# Patient Record
Sex: FEMALE | Race: WHITE | NOT HISPANIC OR LATINO | Employment: FULL TIME | URBAN - METROPOLITAN AREA
[De-identification: names, ages, dates, MRNs, and addresses within clinical notes are randomized per-mention and may not be internally consistent; named-entity substitution may affect disease eponyms.]

---

## 2023-07-21 ENCOUNTER — OFFICE VISIT (OUTPATIENT)
Dept: PRIMARY CARE | Facility: CLINIC | Age: 53
End: 2023-07-21
Payer: COMMERCIAL

## 2023-07-21 VITALS
WEIGHT: 152 LBS | BODY MASS INDEX: 28.7 KG/M2 | DIASTOLIC BLOOD PRESSURE: 89 MMHG | HEART RATE: 72 BPM | OXYGEN SATURATION: 98 % | HEIGHT: 61 IN | SYSTOLIC BLOOD PRESSURE: 149 MMHG

## 2023-07-21 DIAGNOSIS — Z79.899 DRUG THERAPY: ICD-10-CM

## 2023-07-21 DIAGNOSIS — Z00.00 PREVENTATIVE HEALTH CARE: Primary | ICD-10-CM

## 2023-07-21 DIAGNOSIS — Z11.59 NEED FOR HEPATITIS C SCREENING TEST: ICD-10-CM

## 2023-07-21 DIAGNOSIS — J30.2 SEASONAL ALLERGIES: ICD-10-CM

## 2023-07-21 DIAGNOSIS — R20.2 PARESTHESIA: ICD-10-CM

## 2023-07-21 DIAGNOSIS — Z13.9 SCREENING FOR CONDITION: ICD-10-CM

## 2023-07-21 DIAGNOSIS — E55.9 VITAMIN D DEFICIENCY: ICD-10-CM

## 2023-07-21 PROCEDURE — 99215 OFFICE O/P EST HI 40 MIN: CPT | Performed by: FAMILY MEDICINE

## 2023-07-21 PROCEDURE — 99396 PREV VISIT EST AGE 40-64: CPT | Performed by: FAMILY MEDICINE

## 2023-07-21 RX ORDER — MONTELUKAST SODIUM 10 MG/1
10 TABLET ORAL NIGHTLY
Qty: 30 TABLET | Refills: 3 | Status: SHIPPED | OUTPATIENT
Start: 2023-07-21 | End: 2023-10-24 | Stop reason: SDUPTHER

## 2023-07-21 NOTE — PROGRESS NOTES
Subjective   Patient ID: Anu Arcos is a 52 y.o. female who presents for Establish Care (Pt in today to establish care).    Review of Systems  Denies N/V/D/C, no HA/S/V, denies rashes/lesions, no CP/SOB. Denies fevers/chills.  Positive for bilateral upper and lower extremity paresthesias at times.  All other systems were negative.    Objective   Physical Exam  Gen: NAD  eyes: EOMI, PERRLA  ENT: hearing grossly intact, no nasal discharge  resp: CTABL, without R/R  heart: RRR without MRG  GI: abd: S/ND/NT, BS+  lymph: no axillary, cervical, supraclavicular lymphadenopathy noted   MS: gait grossly WNL,  derm: no rashes or lesions noted  neuro: CN II-XII grossly intact  psych: A&Ox3    Assessment/Plan   Diagnoses and all orders for this visit:  Preventative health care  -     CBC and Auto Differential; Future  -     Comprehensive Metabolic Panel; Future  -     TSH with reflex to Free T4 if abnormal; Future  -     Magnesium; Future  -     Lipid Panel; Future  -     Hemoglobin A1C; Future  -     Urinalysis with Reflex Microscopic; Future  -     Vitamin D 25-Hydroxy,Total; Future  -     Hepatitis C antibody; Future  Seasonal allergies  -     montelukast (Singulair) 10 mg tablet; Take 1 tablet (10 mg) by mouth once daily at bedtime.  Drug therapy  -     CBC and Auto Differential; Future  -     Comprehensive Metabolic Panel; Future  -     Magnesium; Future  -     Urinalysis with Reflex Microscopic; Future  Screening for condition  -     TSH with reflex to Free T4 if abnormal; Future  -     Lipid Panel; Future  -     Hemoglobin A1C; Future  Vitamin D deficiency  -     Vitamin D 25-Hydroxy,Total; Future  Need for hepatitis C screening test  -     Hepatitis C antibody; Future  Paresthesia           Annual preventative visit and establish.    -----  Screening for colon cancer.  Patient due for colonoscopy repeat around 2028 through 2031.  Screening for breast cancer. -->>  Getting mammograms about every 2 years.  Will repeat  around December 2023.  Due for screening for cervical cancer.  Can do at next appointment.  Screening for hep C will be done with next labs.    Due for tetanus shot, coronavirus series, shingles series.  Will be due for flu shot end of summer. -->>  Check with your pharmacy to keep up-to-date on immunizations.  -----    BMI 28.  Down about 2 pounds from last weight on the chart 2 years ago. -->> Try to cut back a little on simple carbohydrates, things like bread, pasta, potatoes, rice, sugars etc.    Regarding previous labs:  -Drug therapy, screening for condition.  -Vitamin D deficiency.  Patient is not on any vitamin D at this time.  -->>  Recommend starting vitamin D 2000 units daily.  Get it over-the-counter.  -History of anemia secondary to GI bleed/ulcer.  We will just be checking CBC with these labs.    Seasonal allergies, bad except in the winter.  In general does well with Zyrtec plus Singulair.  Has been out of Singulair and definitely noticing at this time. -->> We will refill Singulair.    GERD, occasionally wakes up with acid in her throat.  Having symptoms maybe 3-4 times a month. -->>  Recommend every time you have symptoms take an antacid like Tums or Maalox along with famotidine 20 mg.    Bilateral hand paresthesias, worse using hands. Getting some permanent change in hands. Also bilateral foot plantar paresthesiae, occasional, especially at night. Might benefit by EMG. -->>  Referring to neurology to evaluate and treat.       - Patient will return in about 3 months for annual lab review and Pap.  - I am ordering fasting annual labs including hepatitis C screening to do at a convenient location about a week before your next appointment.

## 2023-07-21 NOTE — PATIENT INSTRUCTIONS
Annual preventative visit and establish.    -----  Screening for colon cancer.  Patient due for colonoscopy repeat around 2028 through 2031.  Screening for breast cancer. -->>  Getting mammograms about every 2 years.  Will repeat around December 2023.  Due for screening for cervical cancer.  Can do at next appointment.  Screening for hep C will be done with next labs.    Due for tetanus shot, coronavirus series, shingles series.  Will be due for flu shot end of summer. -->>  Check with your pharmacy to keep up-to-date on immunizations.  -----    BMI 28.  Down about 2 pounds from last weight on the chart 2 years ago. -->> Try to cut back a little on simple carbohydrates, things like bread, pasta, potatoes, rice, sugars etc.    Regarding previous labs:  -Drug therapy, screening for condition.  -Vitamin D deficiency.  Patient is not on any vitamin D at this time.  -->>  Recommend starting vitamin D 2000 units daily.  Get it over-the-counter.  -History of anemia secondary to GI bleed/ulcer.  We will just be checking CBC with these labs.    Seasonal allergies, bad except in the winter.  In general does well with Zyrtec plus Singulair.  Has been out of Singulair and definitely noticing at this time. -->> We will refill Singulair.    GERD, occasionally wakes up with acid in her throat.  Having symptoms maybe 3-4 times a month. -->>  Recommend every time you have symptoms take an antacid like Tums or Maalox along with famotidine 20 mg.    Bilateral hand paresthesias, worse using hands. Getting some permanent change in hands. Also bilateral foot plantar paresthesiae, occasional, especially at night. Might benefit by EMG. -->>  Referring to neurology to evaluate and treat.       - Patient will return in about 3 months for annual lab review and Pap.  - I am ordering fasting annual labs including hepatitis C screening to do at a convenient location about a week before your next appointment.

## 2023-09-19 PROBLEM — J45.909 ASTHMA (HHS-HCC): Status: ACTIVE | Noted: 2023-09-19

## 2023-09-19 PROBLEM — J01.90 ACUTE SINUSITIS: Status: ACTIVE | Noted: 2023-09-19

## 2023-09-19 PROBLEM — K92.1 MELENA: Status: ACTIVE | Noted: 2023-09-19

## 2023-09-19 PROBLEM — F41.9 ANXIETY DISORDER: Status: ACTIVE | Noted: 2023-09-19

## 2023-09-19 PROBLEM — R42 DIZZINESS: Status: ACTIVE | Noted: 2023-09-19

## 2023-09-19 PROBLEM — R53.83 FATIGUE: Status: ACTIVE | Noted: 2023-09-19

## 2023-09-19 PROBLEM — D50.9 IRON DEFICIENCY ANEMIA: Status: ACTIVE | Noted: 2023-09-19

## 2023-09-19 PROBLEM — D64.9 NORMOCYTIC ANEMIA: Status: ACTIVE | Noted: 2023-09-19

## 2023-09-19 RX ORDER — METHYLPREDNISOLONE 4 MG/1
TABLET ORAL
COMMUNITY
Start: 2022-10-14 | End: 2023-10-24 | Stop reason: ALTCHOICE

## 2023-09-19 RX ORDER — SUMATRIPTAN 50 MG/1
50 TABLET, FILM COATED ORAL
COMMUNITY
Start: 2019-03-07 | End: 2023-10-24 | Stop reason: ALTCHOICE

## 2023-09-19 RX ORDER — FLUTICASONE PROPIONATE 50 MCG
SPRAY, SUSPENSION (ML) NASAL
COMMUNITY

## 2023-09-19 RX ORDER — ASCORBIC ACID 500 MG
TABLET ORAL
COMMUNITY
Start: 2020-08-10

## 2023-09-19 RX ORDER — AZITHROMYCIN 250 MG/1
TABLET, FILM COATED ORAL
COMMUNITY
End: 2023-10-24 | Stop reason: ALTCHOICE

## 2023-09-19 RX ORDER — AZELASTINE 1 MG/ML
1 SPRAY, METERED NASAL 2 TIMES DAILY
COMMUNITY
Start: 2019-03-07

## 2023-09-19 RX ORDER — ALBUTEROL SULFATE 90 UG/1
1 AEROSOL, METERED RESPIRATORY (INHALATION) EVERY 6 HOURS PRN
COMMUNITY
Start: 2019-05-01

## 2023-09-19 RX ORDER — BENZONATATE 100 MG/1
100 CAPSULE ORAL EVERY 8 HOURS PRN
COMMUNITY
Start: 2019-06-30 | End: 2023-10-24 | Stop reason: ALTCHOICE

## 2023-09-19 RX ORDER — FERROUS GLUCONATE 324(38)MG
1 TABLET ORAL DAILY
COMMUNITY
Start: 2021-03-01 | End: 2023-10-24 | Stop reason: ALTCHOICE

## 2023-09-19 RX ORDER — FLUTICASONE FUROATE AND VILANTEROL 100; 25 UG/1; UG/1
POWDER RESPIRATORY (INHALATION)
COMMUNITY
Start: 2020-08-10 | End: 2023-10-24 | Stop reason: ALTCHOICE

## 2023-09-19 RX ORDER — CALCIUM CARBONATE/VITAMIN D3 600MG-5MCG
TABLET ORAL
COMMUNITY
Start: 2020-08-10

## 2023-10-19 ENCOUNTER — OFFICE VISIT (OUTPATIENT)
Dept: NEUROLOGY | Facility: CLINIC | Age: 53
End: 2023-10-19
Payer: COMMERCIAL

## 2023-10-19 ENCOUNTER — LAB (OUTPATIENT)
Dept: LAB | Facility: LAB | Age: 53
End: 2023-10-19
Payer: COMMERCIAL

## 2023-10-19 VITALS
SYSTOLIC BLOOD PRESSURE: 136 MMHG | DIASTOLIC BLOOD PRESSURE: 90 MMHG | HEIGHT: 61 IN | HEART RATE: 68 BPM | WEIGHT: 148 LBS | BODY MASS INDEX: 27.94 KG/M2

## 2023-10-19 DIAGNOSIS — Z13.9 SCREENING FOR CONDITION: ICD-10-CM

## 2023-10-19 DIAGNOSIS — R20.9 ALTERATIONS OF SENSATIONS: ICD-10-CM

## 2023-10-19 DIAGNOSIS — Z11.59 NEED FOR HEPATITIS C SCREENING TEST: ICD-10-CM

## 2023-10-19 DIAGNOSIS — E55.9 VITAMIN D DEFICIENCY: ICD-10-CM

## 2023-10-19 DIAGNOSIS — R20.0 NUMBNESS AND TINGLING: Primary | ICD-10-CM

## 2023-10-19 DIAGNOSIS — R20.2 NUMBNESS AND TINGLING: Primary | ICD-10-CM

## 2023-10-19 DIAGNOSIS — Z79.899 DRUG THERAPY: ICD-10-CM

## 2023-10-19 DIAGNOSIS — Z00.00 PREVENTATIVE HEALTH CARE: ICD-10-CM

## 2023-10-19 LAB
25(OH)D3 SERPL-MCNC: 32 NG/ML (ref 30–100)
ALBUMIN SERPL BCP-MCNC: 4.6 G/DL (ref 3.4–5)
ALP SERPL-CCNC: 88 U/L (ref 33–110)
ALT SERPL W P-5'-P-CCNC: 20 U/L (ref 7–45)
ANION GAP SERPL CALC-SCNC: 11 MMOL/L (ref 10–20)
AST SERPL W P-5'-P-CCNC: 17 U/L (ref 9–39)
BASOPHILS # BLD AUTO: 0.06 X10*3/UL (ref 0–0.1)
BASOPHILS NFR BLD AUTO: 0.9 %
BILIRUB SERPL-MCNC: 0.5 MG/DL (ref 0–1.2)
BUN SERPL-MCNC: 10 MG/DL (ref 6–23)
CALCIUM SERPL-MCNC: 9.3 MG/DL (ref 8.6–10.3)
CHLORIDE SERPL-SCNC: 105 MMOL/L (ref 98–107)
CHOLEST SERPL-MCNC: 194 MG/DL (ref 0–199)
CHOLESTEROL/HDL RATIO: 3.2
CO2 SERPL-SCNC: 30 MMOL/L (ref 21–32)
CREAT SERPL-MCNC: 0.67 MG/DL (ref 0.5–1.05)
EOSINOPHIL # BLD AUTO: 0.18 X10*3/UL (ref 0–0.7)
EOSINOPHIL NFR BLD AUTO: 2.7 %
ERYTHROCYTE [DISTWIDTH] IN BLOOD BY AUTOMATED COUNT: 13 % (ref 11.5–14.5)
EST. AVERAGE GLUCOSE BLD GHB EST-MCNC: 100 MG/DL
GFR SERPL CREATININE-BSD FRML MDRD: >90 ML/MIN/1.73M*2
GLUCOSE SERPL-MCNC: 82 MG/DL (ref 74–99)
HBA1C MFR BLD: 5.1 %
HCT VFR BLD AUTO: 43.8 % (ref 36–46)
HCV AB SER QL: NONREACTIVE
HDLC SERPL-MCNC: 61.2 MG/DL
HGB BLD-MCNC: 14.2 G/DL (ref 12–16)
IMM GRANULOCYTES # BLD AUTO: 0.03 X10*3/UL (ref 0–0.7)
IMM GRANULOCYTES NFR BLD AUTO: 0.4 % (ref 0–0.9)
LDLC SERPL CALC-MCNC: 107 MG/DL
LYMPHOCYTES # BLD AUTO: 1.86 X10*3/UL (ref 1.2–4.8)
LYMPHOCYTES NFR BLD AUTO: 27.7 %
MAGNESIUM SERPL-MCNC: 2.19 MG/DL (ref 1.6–2.4)
MCH RBC QN AUTO: 29.4 PG (ref 26–34)
MCHC RBC AUTO-ENTMCNC: 32.4 G/DL (ref 32–36)
MCV RBC AUTO: 91 FL (ref 80–100)
MONOCYTES # BLD AUTO: 0.59 X10*3/UL (ref 0.1–1)
MONOCYTES NFR BLD AUTO: 8.8 %
NEUTROPHILS # BLD AUTO: 4 X10*3/UL (ref 1.2–7.7)
NEUTROPHILS NFR BLD AUTO: 59.5 %
NON HDL CHOLESTEROL: 133 MG/DL (ref 0–149)
NRBC BLD-RTO: 0 /100 WBCS (ref 0–0)
PLATELET # BLD AUTO: 253 X10*3/UL (ref 150–450)
PMV BLD AUTO: 10.6 FL (ref 7.5–11.5)
POTASSIUM SERPL-SCNC: 4.3 MMOL/L (ref 3.5–5.3)
PROT SERPL-MCNC: 7.1 G/DL (ref 6.4–8.2)
RBC # BLD AUTO: 4.83 X10*6/UL (ref 4–5.2)
SODIUM SERPL-SCNC: 142 MMOL/L (ref 136–145)
TRIGL SERPL-MCNC: 131 MG/DL (ref 0–149)
TSH SERPL-ACNC: 1.57 MIU/L (ref 0.44–3.98)
VLDL: 26 MG/DL (ref 0–40)
WBC # BLD AUTO: 6.7 X10*3/UL (ref 4.4–11.3)

## 2023-10-19 PROCEDURE — 85025 COMPLETE CBC W/AUTO DIFF WBC: CPT

## 2023-10-19 PROCEDURE — 36415 COLL VENOUS BLD VENIPUNCTURE: CPT

## 2023-10-19 PROCEDURE — 80061 LIPID PANEL: CPT

## 2023-10-19 PROCEDURE — 86803 HEPATITIS C AB TEST: CPT

## 2023-10-19 PROCEDURE — 83735 ASSAY OF MAGNESIUM: CPT

## 2023-10-19 PROCEDURE — 99214 OFFICE O/P EST MOD 30 MIN: CPT | Performed by: NURSE PRACTITIONER

## 2023-10-19 PROCEDURE — 84443 ASSAY THYROID STIM HORMONE: CPT

## 2023-10-19 PROCEDURE — 1036F TOBACCO NON-USER: CPT | Performed by: NURSE PRACTITIONER

## 2023-10-19 PROCEDURE — 80053 COMPREHEN METABOLIC PANEL: CPT

## 2023-10-19 PROCEDURE — 82306 VITAMIN D 25 HYDROXY: CPT

## 2023-10-19 PROCEDURE — 83036 HEMOGLOBIN GLYCOSYLATED A1C: CPT

## 2023-10-19 ASSESSMENT — ENCOUNTER SYMPTOMS
OCCASIONAL FEELINGS OF UNSTEADINESS: 0
DEPRESSION: 0
LOSS OF SENSATION IN FEET: 0

## 2023-10-19 ASSESSMENT — PATIENT HEALTH QUESTIONNAIRE - PHQ9
2. FEELING DOWN, DEPRESSED OR HOPELESS: NOT AT ALL
SUM OF ALL RESPONSES TO PHQ9 QUESTIONS 1 AND 2: 0
1. LITTLE INTEREST OR PLEASURE IN DOING THINGS: NOT AT ALL

## 2023-10-19 NOTE — PROGRESS NOTES
Patient being assessed today for initial evaluation of numbness and tingling.  She states that for the past 2 years she has been experiencing numbness and tingling in all of her fingertips.  The intensity changes day-to-day.  She also experiences numbness and tingling in all of her toes and over the summer was at the bottom of her feet.  There is no radiation up the extremities.  She does have some dexterity issues as well as sensation changed to hot and cold.  Some alteration also to cotton and pinprick.  Strength and muscle tone equal bilaterally.  We will get an EMG to rule out possible small fiber neuropathy.  She has labs that are pending through her PCP.  Also discussed patient could start taking a B complex vitamin supplement that may help reduce some of her symptoms.  Follow-up after testing completed.    This note was created with voice recognition software and was not corrected for typographical or grammatical errors

## 2023-10-20 ENCOUNTER — APPOINTMENT (OUTPATIENT)
Dept: PRIMARY CARE | Facility: CLINIC | Age: 53
End: 2023-10-20
Payer: COMMERCIAL

## 2023-10-24 ENCOUNTER — OFFICE VISIT (OUTPATIENT)
Dept: PRIMARY CARE | Facility: CLINIC | Age: 53
End: 2023-10-24
Payer: COMMERCIAL

## 2023-10-24 VITALS
DIASTOLIC BLOOD PRESSURE: 83 MMHG | SYSTOLIC BLOOD PRESSURE: 134 MMHG | WEIGHT: 150 LBS | OXYGEN SATURATION: 98 % | HEART RATE: 77 BPM | HEIGHT: 61 IN | BODY MASS INDEX: 28.32 KG/M2

## 2023-10-24 DIAGNOSIS — Z12.4 CERVICAL CANCER SCREENING: ICD-10-CM

## 2023-10-24 DIAGNOSIS — R20.0 NUMBNESS AND TINGLING: ICD-10-CM

## 2023-10-24 DIAGNOSIS — J30.2 SEASONAL ALLERGIES: ICD-10-CM

## 2023-10-24 DIAGNOSIS — Z13.9 SCREENING FOR CONDITION: ICD-10-CM

## 2023-10-24 DIAGNOSIS — E55.9 VITAMIN D DEFICIENCY: ICD-10-CM

## 2023-10-24 DIAGNOSIS — Z79.899 DRUG THERAPY: ICD-10-CM

## 2023-10-24 DIAGNOSIS — E78.5 HYPERLIPIDEMIA, UNSPECIFIED HYPERLIPIDEMIA TYPE: ICD-10-CM

## 2023-10-24 DIAGNOSIS — Z00.00 PREVENTATIVE HEALTH CARE: ICD-10-CM

## 2023-10-24 DIAGNOSIS — R20.2 PARESTHESIA: ICD-10-CM

## 2023-10-24 DIAGNOSIS — Z12.31 ENCOUNTER FOR SCREENING MAMMOGRAM FOR MALIGNANT NEOPLASM OF BREAST: Primary | ICD-10-CM

## 2023-10-24 DIAGNOSIS — R20.2 NUMBNESS AND TINGLING: ICD-10-CM

## 2023-10-24 PROCEDURE — 1036F TOBACCO NON-USER: CPT | Performed by: FAMILY MEDICINE

## 2023-10-24 PROCEDURE — 99417 PROLNG OP E/M EACH 15 MIN: CPT | Performed by: FAMILY MEDICINE

## 2023-10-24 PROCEDURE — 88175 CYTOPATH C/V AUTO FLUID REDO: CPT

## 2023-10-24 PROCEDURE — 87624 HPV HI-RISK TYP POOLED RSLT: CPT

## 2023-10-24 PROCEDURE — 99215 OFFICE O/P EST HI 40 MIN: CPT | Performed by: FAMILY MEDICINE

## 2023-10-24 RX ORDER — MONTELUKAST SODIUM 10 MG/1
10 TABLET ORAL NIGHTLY
Qty: 90 TABLET | Refills: 3 | Status: SHIPPED | OUTPATIENT
Start: 2023-10-24 | End: 2024-10-18

## 2023-10-24 NOTE — PATIENT INSTRUCTIONS
2024 will be next annual preventative visit.     -----  Screening for colon cancer.  Patient due for colonoscopy repeat around 2028 through 2031.  Screening for breast cancer. -->>  Getting mammograms about every 2 years.  Will repeat around December 2023.  Due for screening for cervical cancer.  Can do at next appointment.  Screening for hep C was nonreactive.     Due for tetanus shot, coronavirus series, shingles series.  Will be due for flu shot end of summer. -->>  Check with your pharmacy to keep up-to-date on immunizations.  -----     BMI 28.  about same as last appt. -->> Try to cut back a little on simple carbohydrates, things like bread, pasta, potatoes, rice, sugars etc.     Regarding previous labs:  - Drug therapy, screening for condition.  A1c is 5.1 so no sign of diabetes.  - Vitamin D deficiency.  32 on this lab.  Is on 2000 units daily.  Has been for a couple of months. -->>  Recommend increasing to 5000 units daily.  We will recheck in 12 months.    - Hyperlipidemia: HDL is 61, goal is over 45.  LDL is 107, your goal is less than 100.  Total divided by good ratio is 3.2, your goal is less than 3.4.  So overall cholesterol numbers are good without any meds.     Seasonal allergies, with asthma.  Bad except in the winter. Doing very well with Zyrtec plus Singulair plus as Astelin nasal spray as needed..  Needing albuterol inhaler less than once a week during the summer, not at all during the winter.-->> We will refill Singulair as needed.     GERD. Having symptoms maybe 2 times a month, improved by paying better attention to what she is eating and when she is eating.  Generally does well enough taking omeprazole just a couple times a month as needed. -->> Recommend every time you have symptoms take an antacid like Tums or Maalox along with famotidine 20 mg.     Bilateral hand paresthesias, worse using hands. Carpal tunnel is in the differential.  In getting some permanent change in hands. Also bilateral  foot plantar paresthesiae, occasional, especially at night. Might benefit by EMG.  Seeing neurology.  They discussed an EMG.  Patient has orders for that.     Headaches, history of migraine.  Imitrex was not helpful and actually caused more dizziness and side effects than it was worth.  Does okay taking and using Tylenol as needed.     Subscapular spasms the other day.  First time she is ever had that.  Went to urgent care, was given Toradol and steroid injections, also muscle relaxers.  Resolved at this time. -->>  For future spasms, could try using heat because that can soften muscles.  Also try to wrap around the muscle to allow it to be shorter, that can help her relax.  Could consider starting a magnesium supplement like magnesium oxide 250, 400, or 500 mg once daily.  Note magnesium can also lessen frequency of headaches.     Postmenopausal, gets occasional hot flashes, nothing too serious.  In general doing fairly well.  Female exam and Pap done today.     - Patient will return in about 12 months for annual preventative visit and annual lab review.  -We will order annual labs for patient to get fasting annual labs about a week before next appointment.

## 2023-10-24 NOTE — PROGRESS NOTES
Subjective   Patient ID: Anu Arcos is a 53 y.o. female who presents for 3 Month FU / Pap Test (Pt in today for routine 3 month FU / Pap Test).    Review of Systems  Denies N/V/D/C, no HA/S/V, denies rashes/lesions, no CP/SOB. Denies fevers/chills.  Positive for occasional heartburn.  All other systems were negative.    Objective   Physical Exam  Gen: NAD  eyes: EOMI, PERRLA  ENT: hearing grossly intact, no nasal discharge  resp: CTABL, without R/R  heart: RRR without MRG  GI: abd: S/ND/NT, BS+  Breast: No masses or tenderness or discoloration or lesions are noted. No discharge noted.  : External genitalia without masses or lesion, no exudate or discharge. Vaginal vault is without discharge. Cervix is retroverted to the left, normal color, no lesion, os os is closed. Uterus and cervix nontender. No other masses or tenderness are noted.  lymph: no axillary, cervical, supraclavicular lymphadenopathy noted   MS: gait grossly WNL,  derm: no rashes or lesions noted  neuro: CN II-XII grossly intact  psych: A&Ox3      Assessment/Plan   Diagnoses and all orders for this visit:  Cervical cancer screening  -     THINPREP PAP      2024 will be next annual preventative visit.     -----  Screening for colon cancer.  Patient due for colonoscopy repeat around 2028 through 2031.  Screening for breast cancer. -->>  Getting mammograms about every 2 years.  Will repeat around December 2023.  Due for screening for cervical cancer.  Can do at next appointment.  Screening for hep C was nonreactive.    Due for tetanus shot, coronavirus series, shingles series.  Will be due for flu shot end of summer. -->>  Check with your pharmacy to keep up-to-date on immunizations.  -----     BMI 28.  about same as last appt. -->> Try to cut back a little on simple carbohydrates, things like bread, pasta, potatoes, rice, sugars etc.    Regarding previous labs:  - Drug therapy, screening for condition.  A1c is 5.1 so no sign of diabetes.  - Vitamin D  deficiency.  32 on this lab.  Is on 2000 units daily.  Has been for a couple of months. -->>  Recommend increasing to 5000 units daily.  We will recheck in 12 months.    - Hyperlipidemia: HDL is 61, goal is over 45.  LDL is 107, your goal is less than 100.  Total divided by good ratio is 3.2, your goal is less than 3.4.  So overall cholesterol numbers are good without any meds.    Seasonal allergies, with asthma.  Bad except in the winter. Doing very well with Zyrtec plus Singulair plus as Astelin nasal spray as needed..  Needing albuterol inhaler less than once a week during the summer, not at all during the winter.-->> We will refill Singulair as needed.     GERD. Having symptoms maybe 2 times a month, improved by paying better attention to what she is eating and when she is eating.  Generally does well enough taking omeprazole just a couple times a month as needed. -->> Recommend every time you have symptoms take an antacid like Tums or Maalox along with famotidine 20 mg.     Bilateral hand paresthesias, worse using hands. Carpal tunnel is in the differential.  In getting some permanent change in hands. Also bilateral foot plantar paresthesiae, occasional, especially at night. Might benefit by EMG.  Seeing neurology.  They discussed an EMG.  Patient has orders for that.    Headaches, history of migraine.  Imitrex was not helpful and actually caused more dizziness and side effects than it was worth.  Does okay taking and using Tylenol as needed.    Subscapular spasms the other day.  First time she is ever had that.  Went to urgent care, was given Toradol and steroid injections, also muscle relaxers.  Resolved at this time. -->>  For future spasms, could try using heat because that can soften muscles.  Also try to wrap around the muscle to allow it to be shorter, that can help her relax.  Could consider starting a magnesium supplement like magnesium oxide 250, 400, or 500 mg once daily.  Note magnesium can also  lessen frequency of headaches.    Postmenopausal, gets occasional hot flashes, nothing too serious.  In general doing fairly well.  Female exam and Pap done today.    - Patient will return in about 12 months for annual preventative visit and annual lab review.  -We will order annual labs for patient to get fasting annual labs about a week before next appointment.

## 2023-11-08 LAB
CYTOLOGY CMNT CVX/VAG CYTO-IMP: NORMAL
HPV HR GENOTYPES PNL CVX NAA+PROBE: NEGATIVE
HPV HR GENOTYPES PNL CVX NAA+PROBE: NEGATIVE
HPV16 DNA SPEC QL NAA+PROBE: NEGATIVE
HPV18 DNA SPEC QL NAA+PROBE: NEGATIVE
LAB AP HPV GENOTYPE QUESTION: YES
LAB AP HPV HR: NORMAL
LABORATORY COMMENT REPORT: NORMAL
LMP START DATE: NORMAL
MENSTRUAL HX REPORTED: NORMAL
PATH REPORT.TOTAL CANCER: NORMAL

## 2024-07-16 ENCOUNTER — HOSPITAL ENCOUNTER (OUTPATIENT)
Dept: RADIOLOGY | Facility: CLINIC | Age: 54
Discharge: HOME | End: 2024-07-16
Payer: COMMERCIAL

## 2024-07-16 VITALS — WEIGHT: 150 LBS | HEIGHT: 61 IN | BODY MASS INDEX: 28.32 KG/M2

## 2024-07-16 DIAGNOSIS — Z12.31 ENCOUNTER FOR SCREENING MAMMOGRAM FOR MALIGNANT NEOPLASM OF BREAST: ICD-10-CM

## 2024-07-16 PROCEDURE — 77067 SCR MAMMO BI INCL CAD: CPT | Performed by: RADIOLOGY

## 2024-07-16 PROCEDURE — 77063 BREAST TOMOSYNTHESIS BI: CPT

## 2024-07-16 PROCEDURE — 77063 BREAST TOMOSYNTHESIS BI: CPT | Performed by: RADIOLOGY

## 2024-10-18 ENCOUNTER — LAB (OUTPATIENT)
Dept: LAB | Facility: LAB | Age: 54
End: 2024-10-18
Payer: COMMERCIAL

## 2024-10-18 DIAGNOSIS — Z00.00 PREVENTATIVE HEALTH CARE: ICD-10-CM

## 2024-10-18 DIAGNOSIS — E55.9 VITAMIN D DEFICIENCY: ICD-10-CM

## 2024-10-18 DIAGNOSIS — E78.5 HYPERLIPIDEMIA, UNSPECIFIED HYPERLIPIDEMIA TYPE: ICD-10-CM

## 2024-10-18 DIAGNOSIS — Z79.899 DRUG THERAPY: ICD-10-CM

## 2024-10-18 DIAGNOSIS — Z13.9 SCREENING FOR CONDITION: ICD-10-CM

## 2024-10-18 LAB
25(OH)D3 SERPL-MCNC: 54 NG/ML (ref 30–100)
ALBUMIN SERPL BCP-MCNC: 4.4 G/DL (ref 3.4–5)
ALP SERPL-CCNC: 93 U/L (ref 33–110)
ALT SERPL W P-5'-P-CCNC: 43 U/L (ref 7–45)
ANION GAP SERPL CALC-SCNC: 11 MMOL/L (ref 10–20)
AST SERPL W P-5'-P-CCNC: 26 U/L (ref 9–39)
BASOPHILS # BLD AUTO: 0.06 X10*3/UL (ref 0–0.1)
BASOPHILS NFR BLD AUTO: 1 %
BILIRUB SERPL-MCNC: 0.5 MG/DL (ref 0–1.2)
BUN SERPL-MCNC: 9 MG/DL (ref 6–23)
CALCIUM SERPL-MCNC: 9.3 MG/DL (ref 8.6–10.3)
CHLORIDE SERPL-SCNC: 105 MMOL/L (ref 98–107)
CHOLEST SERPL-MCNC: 207 MG/DL (ref 0–199)
CHOLESTEROL/HDL RATIO: 3.3
CO2 SERPL-SCNC: 28 MMOL/L (ref 21–32)
CREAT SERPL-MCNC: 0.64 MG/DL (ref 0.5–1.05)
EGFRCR SERPLBLD CKD-EPI 2021: >90 ML/MIN/1.73M*2
EOSINOPHIL # BLD AUTO: 0.29 X10*3/UL (ref 0–0.7)
EOSINOPHIL NFR BLD AUTO: 4.7 %
ERYTHROCYTE [DISTWIDTH] IN BLOOD BY AUTOMATED COUNT: 13 % (ref 11.5–14.5)
EST. AVERAGE GLUCOSE BLD GHB EST-MCNC: 103 MG/DL
GLUCOSE SERPL-MCNC: 78 MG/DL (ref 74–99)
HBA1C MFR BLD: 5.2 %
HCT VFR BLD AUTO: 42.8 % (ref 36–46)
HDLC SERPL-MCNC: 62.3 MG/DL
HGB BLD-MCNC: 14.1 G/DL (ref 12–16)
IMM GRANULOCYTES # BLD AUTO: 0.02 X10*3/UL (ref 0–0.7)
IMM GRANULOCYTES NFR BLD AUTO: 0.3 % (ref 0–0.9)
LDLC SERPL CALC-MCNC: 121 MG/DL
LYMPHOCYTES # BLD AUTO: 1.49 X10*3/UL (ref 1.2–4.8)
LYMPHOCYTES NFR BLD AUTO: 24.2 %
MAGNESIUM SERPL-MCNC: 2.09 MG/DL (ref 1.6–2.4)
MCH RBC QN AUTO: 29.4 PG (ref 26–34)
MCHC RBC AUTO-ENTMCNC: 32.9 G/DL (ref 32–36)
MCV RBC AUTO: 89 FL (ref 80–100)
MONOCYTES # BLD AUTO: 0.51 X10*3/UL (ref 0.1–1)
MONOCYTES NFR BLD AUTO: 8.3 %
NEUTROPHILS # BLD AUTO: 3.78 X10*3/UL (ref 1.2–7.7)
NEUTROPHILS NFR BLD AUTO: 61.5 %
NON HDL CHOLESTEROL: 145 MG/DL (ref 0–149)
NRBC BLD-RTO: 0 /100 WBCS (ref 0–0)
PLATELET # BLD AUTO: 239 X10*3/UL (ref 150–450)
POTASSIUM SERPL-SCNC: 4.3 MMOL/L (ref 3.5–5.3)
PROT SERPL-MCNC: 6.8 G/DL (ref 6.4–8.2)
RBC # BLD AUTO: 4.8 X10*6/UL (ref 4–5.2)
SODIUM SERPL-SCNC: 140 MMOL/L (ref 136–145)
TRIGL SERPL-MCNC: 119 MG/DL (ref 0–149)
TSH SERPL-ACNC: 1.05 MIU/L (ref 0.44–3.98)
VLDL: 24 MG/DL (ref 0–40)
WBC # BLD AUTO: 6.2 X10*3/UL (ref 4.4–11.3)

## 2024-10-18 PROCEDURE — 80061 LIPID PANEL: CPT

## 2024-10-18 PROCEDURE — 83735 ASSAY OF MAGNESIUM: CPT

## 2024-10-18 PROCEDURE — 80053 COMPREHEN METABOLIC PANEL: CPT

## 2024-10-18 PROCEDURE — 83036 HEMOGLOBIN GLYCOSYLATED A1C: CPT

## 2024-10-18 PROCEDURE — 36415 COLL VENOUS BLD VENIPUNCTURE: CPT

## 2024-10-18 PROCEDURE — 84443 ASSAY THYROID STIM HORMONE: CPT

## 2024-10-18 PROCEDURE — 82306 VITAMIN D 25 HYDROXY: CPT

## 2024-10-18 PROCEDURE — 85025 COMPLETE CBC W/AUTO DIFF WBC: CPT

## 2024-10-22 ENCOUNTER — APPOINTMENT (OUTPATIENT)
Dept: PRIMARY CARE | Facility: CLINIC | Age: 54
End: 2024-10-22
Payer: COMMERCIAL

## 2024-10-28 DIAGNOSIS — J30.2 SEASONAL ALLERGIES: ICD-10-CM

## 2024-10-28 RX ORDER — MONTELUKAST SODIUM 10 MG/1
10 TABLET ORAL NIGHTLY
Qty: 90 TABLET | Refills: 3 | Status: SHIPPED | OUTPATIENT
Start: 2024-10-28

## 2024-10-29 ENCOUNTER — APPOINTMENT (OUTPATIENT)
Dept: PRIMARY CARE | Facility: CLINIC | Age: 54
End: 2024-10-29
Payer: COMMERCIAL

## 2024-10-29 VITALS
HEART RATE: 98 BPM | WEIGHT: 150 LBS | SYSTOLIC BLOOD PRESSURE: 122 MMHG | OXYGEN SATURATION: 98 % | BODY MASS INDEX: 28.32 KG/M2 | DIASTOLIC BLOOD PRESSURE: 82 MMHG | HEIGHT: 61 IN

## 2024-10-29 DIAGNOSIS — G43.909 MIGRAINE WITHOUT STATUS MIGRAINOSUS, NOT INTRACTABLE, UNSPECIFIED MIGRAINE TYPE: ICD-10-CM

## 2024-10-29 DIAGNOSIS — K21.9 GASTROESOPHAGEAL REFLUX DISEASE, UNSPECIFIED WHETHER ESOPHAGITIS PRESENT: ICD-10-CM

## 2024-10-29 DIAGNOSIS — Z79.899 DRUG THERAPY: ICD-10-CM

## 2024-10-29 DIAGNOSIS — E55.9 VITAMIN D DEFICIENCY: ICD-10-CM

## 2024-10-29 DIAGNOSIS — G56.03 BILATERAL CARPAL TUNNEL SYNDROME: ICD-10-CM

## 2024-10-29 DIAGNOSIS — J01.00 ACUTE MAXILLARY SINUSITIS, RECURRENCE NOT SPECIFIED: ICD-10-CM

## 2024-10-29 DIAGNOSIS — Z00.00 PREVENTATIVE HEALTH CARE: Primary | ICD-10-CM

## 2024-10-29 DIAGNOSIS — E78.5 HYPERLIPIDEMIA, UNSPECIFIED HYPERLIPIDEMIA TYPE: ICD-10-CM

## 2024-10-29 DIAGNOSIS — Z13.9 SCREENING FOR CONDITION: ICD-10-CM

## 2024-10-29 PROCEDURE — 90715 TDAP VACCINE 7 YRS/> IM: CPT | Performed by: FAMILY MEDICINE

## 2024-10-29 PROCEDURE — 90471 IMMUNIZATION ADMIN: CPT | Performed by: FAMILY MEDICINE

## 2024-10-29 PROCEDURE — 3008F BODY MASS INDEX DOCD: CPT | Performed by: FAMILY MEDICINE

## 2024-10-29 PROCEDURE — 1036F TOBACCO NON-USER: CPT | Performed by: FAMILY MEDICINE

## 2024-10-29 PROCEDURE — 99214 OFFICE O/P EST MOD 30 MIN: CPT | Performed by: FAMILY MEDICINE

## 2024-10-29 PROCEDURE — 99396 PREV VISIT EST AGE 40-64: CPT | Performed by: FAMILY MEDICINE

## 2024-10-29 RX ORDER — AMOXICILLIN AND CLAVULANATE POTASSIUM 875; 125 MG/1; MG/1
TABLET, FILM COATED ORAL
Qty: 20 TABLET | Refills: 0 | Status: SHIPPED | OUTPATIENT
Start: 2024-10-29

## 2024-10-29 RX ORDER — OMEPRAZOLE 20 MG/1
20 CAPSULE, DELAYED RELEASE ORAL DAILY
Qty: 90 CAPSULE | Refills: 3 | Status: SHIPPED | OUTPATIENT
Start: 2024-10-29 | End: 2025-10-24

## 2024-11-07 ENCOUNTER — OFFICE VISIT (OUTPATIENT)
Dept: ORTHOPEDIC SURGERY | Facility: CLINIC | Age: 54
End: 2024-11-07
Payer: COMMERCIAL

## 2024-11-07 DIAGNOSIS — G56.03 BILATERAL CARPAL TUNNEL SYNDROME: ICD-10-CM

## 2024-11-07 PROCEDURE — 99214 OFFICE O/P EST MOD 30 MIN: CPT | Performed by: ORTHOPAEDIC SURGERY

## 2024-11-07 NOTE — PROGRESS NOTES
11/7/2024    Chief Complaint   Patient presents with    Left Wrist - New Patient Visit, Carpal Tunnel    Right Wrist - Carpal Tunnel       History of Present Illness:  Patient Anu Arcos , 54 y.o. female, presents today, 11/7/2024, for evaluation of bilateral hand pain and numbness.  Symptoms are about equal on the left in comparison to the right.  She is right-hand dominant individual and otherwise healthy.  She reports that she has pain weakness and numbness to the hands affecting thumb index and long finger bilaterally.  This has been steadily worsening over the last 2 to 3 years.  She has tried nighttime bracing with no relief.  She states that she originally had symptoms similar to this 17 years ago when she was pregnant with twins, after delivery symptoms improved, but then symptoms have now returned.  She feels that she has clumsiness and weakness and is very concerned about dropping things.  She denies any discrete injury or trauma associated with symptom onset.       Review of Systems:   GENERAL: Negative  GI: Negative  MUSCULOSKELETAL: See HPI  SKIN: Negative  NEURO:  Negative     Physical Exam:  GENERAL:  Alert and oriented to person, place, and time.  No acute distress and breathing comfortably; pleasant and cooperative with the examination.  HEENT:  Head is normocephalic and atraumatic.  NECK:  Supple, no visible swelling.  CARDIOVASCULAR:  No palpable tachycardia.  LUNGS:  No audible wheezing or labored breathing.  ABDOMEN:  Nondistended.  Extremities: Evaluation of bilateral upper extremities finds the patient to have a palpable radial artery at the wrist with brisk capillary refill to all digits. The patient has intact sensorium to axillary, radial, median and ulnar nerves. There are no open wounds. There are no signs of infection. There is no evidence of lymphedema or lymphatic streaking. The patient has supple compartments of the bilateral arms, forearms and hands.  Positive Tinel's over the  median nerves the bilateral wrist.  Positive Phalen's and Durkan's compression maneuver bilaterally.     Imaging/Test Results:  None today.     Assessment:  Bilateral carpal tunnel syndrome, recalcitrant to nonoperative treatment strategies with substantial functional difficulties.     Plan:  Treatment options were discussed including both operative and non-operative treatment strategies.  Patient has strong preference for surgical intervention as she has been working through nonoperative strategies of bracing and activity modification for years with little relief.  Patient elects to proceed forth with left carpal tunnel release under wide-awake approach to anesthesia.  Risks, benefits, and alternatives to surgery were discussed including, but not limited to infection risk, persistent or incomplete relief of pain, swelling, or stiffness, and post-operative pain and discomfort.  The patient verbalized agreement and understanding of the plan for care.  All questions answered at today's visit.  Plan for follow-up 10-14 days post-op.  She elects for observation on the right side and continuance of nighttime bracing for now, will likely consider surgical intervention on that side at follow-up.    In a face to face encounter, I performed a history and physical examination, discussed pertinent diagnostic studies if indicated, and discussed diagnosis and management strategies with both the patient and the mid-level provider. I reviewed the mid-level's note and agree with the documented findings and plan of care.  Patient presents today for evaluation of symptomatic bilateral carpal tunnel syndrome.  Positive Tinel's over course of median nerve to bilateral wrist.  Symptoms have been longstanding although substantially worse over the last year.  Symptoms are equal on the right as compared to the left.  Treatment options were discussed.  We talked about operative and nonoperative strategies.  We talked about intraoperative  techniques and postoperative protocols.  Patient elects to forego any additional nonoperative measures in favor of left carpal tunnel release.  Plan for wide-awake approach to anesthesia.

## 2024-11-08 ENCOUNTER — TELEPHONE (OUTPATIENT)
Dept: ORTHOPEDIC SURGERY | Facility: CLINIC | Age: 54
End: 2024-11-08
Payer: COMMERCIAL

## 2024-11-08 NOTE — TELEPHONE ENCOUNTER
Called patient to schedule recommended surgery by Dr. Ochoa. Call went to voicemail and a message was left with Alaina's direct phone number.

## 2024-11-21 DIAGNOSIS — G89.18 POST-OP PAIN: Primary | ICD-10-CM

## 2024-11-21 RX ORDER — HYDROCODONE BITARTRATE AND ACETAMINOPHEN 5; 325 MG/1; MG/1
1 TABLET ORAL EVERY 8 HOURS PRN
Qty: 6 TABLET | Refills: 0 | Status: SHIPPED | OUTPATIENT
Start: 2024-11-21 | End: 2024-11-23

## 2024-11-22 PROCEDURE — 64721 CARPAL TUNNEL SURGERY: CPT | Performed by: ORTHOPAEDIC SURGERY

## 2024-12-05 ENCOUNTER — OFFICE VISIT (OUTPATIENT)
Dept: ORTHOPEDIC SURGERY | Facility: CLINIC | Age: 54
End: 2024-12-05
Payer: COMMERCIAL

## 2024-12-05 DIAGNOSIS — G56.03 BILATERAL CARPAL TUNNEL SYNDROME: Primary | ICD-10-CM

## 2024-12-05 PROCEDURE — 99211 OFF/OP EST MAY X REQ PHY/QHP: CPT | Performed by: ORTHOPAEDIC SURGERY

## 2024-12-05 NOTE — PROGRESS NOTES
"    12/5/2024    Chief Complaint   Patient presents with    Left Wrist - Post-op     CTR  DOS: 11/22/24       History of Present Illness:  Patient Anu Arcos , 54 y.o. female, presents today, 12/5/2024, for evaluation of left hand  carpal tunnel release, 2 weeks postop. Numbness and tingling still persists, but is \"slightly better\".  She denies any fevers, chills, constitutional symptoms.  Minimal pain and discomfort .         Review of Systems:   GENERAL: Negative  GI: Negative  MUSCULOSKELETAL: See HPI  SKIN: Negative  NEURO:  Negative     Physical Exam:  GENERAL:  Alert and oriented to person, place, and time.  No acute distress and breathing comfortably; pleasant and cooperative with the examination.  HEENT:  Head is normocephalic and atraumatic.  NECK:  Supple, no visible swelling.  CARDIOVASCULAR:  No palpable tachycardia.  LUNGS:  No audible wheezing or labored breathing.  ABDOMEN:  Nondistended.  Extremities: The surgical incision is clean, dry, intact, and appears to be healing well.  No active bleeding, erythema, warmth, drainage, or signs of infection.  Appropriate functional ROM demonstrated with flexion/extension of the digits, and flexion/extension/pronosupination of the wrist.     Imaging/Test Results:  None today.       Assessment:  Left carpal tunnel release, 2 weeks postop with minimal improvement in numbness and tingling.     Plan:  Sutures were removed in the office today.  The patient can begin to weight bear as tolerated.  We discussed and reviewed home exercise program for range of motion recovery, scar massage, and desensitization techniques.  They can return to activities as tolerated.  The patient will follow-up with our office in 4 to 6 weeks for repeat clinical exam, we will likely turn our attention of the right side at that time.  All patient questions answered at today's visit.    Jeanie Kirk PA-C  "

## 2025-01-07 ENCOUNTER — APPOINTMENT (OUTPATIENT)
Dept: ORTHOPEDIC SURGERY | Facility: CLINIC | Age: 55
End: 2025-01-07
Payer: COMMERCIAL

## 2025-01-21 ENCOUNTER — OFFICE VISIT (OUTPATIENT)
Dept: ORTHOPEDIC SURGERY | Facility: CLINIC | Age: 55
End: 2025-01-21
Payer: COMMERCIAL

## 2025-01-21 DIAGNOSIS — G56.03 BILATERAL CARPAL TUNNEL SYNDROME: Primary | ICD-10-CM

## 2025-01-21 PROCEDURE — 99211 OFF/OP EST MAY X REQ PHY/QHP: CPT | Performed by: ORTHOPAEDIC SURGERY

## 2025-01-21 PROCEDURE — 99024 POSTOP FOLLOW-UP VISIT: CPT | Performed by: ORTHOPAEDIC SURGERY

## 2025-01-21 PROCEDURE — 1036F TOBACCO NON-USER: CPT | Performed by: ORTHOPAEDIC SURGERY

## 2025-01-21 NOTE — PROGRESS NOTES
1/21/2025    Chief Complaint   Patient presents with    Left Wrist - Follow-up     CTR  DOS: 11/22/24       History of Present Illness:  Patient Anu Arcos , 54 y.o. female, presents today, 1/21/2025, for evaluation of left hand  carpal tunnel release, 8 weeks out.  She states that numbness and tingling is nearly resolved.  She still has some pain and firmness through zone of dissection at the scar but feels this is slowly improving over time.  Functionally it does not really restrict her from any activities .         Review of Systems:   GENERAL: Negative  GI: Negative  MUSCULOSKELETAL: See HPI  SKIN: Negative  NEURO:  Negative     Physical Exam:  GENERAL:  Alert and oriented to person, place, and time.  No acute distress and breathing comfortably; pleasant and cooperative with the examination.  HEENT:  Head is normocephalic and atraumatic.  NECK:  Supple, no visible swelling.  CARDIOVASCULAR:  No palpable tachycardia.  LUNGS:  No audible wheezing or labored breathing.  ABDOMEN:  Nondistended.  Extremities: Evaluation of left upper extremity finds the patient to have a palpable radial artery at the wrist with brisk capillary refill to all digits. The patient has intact sensorium to axillary, radial, median and ulnar nerves. There are no open wounds. There are no signs of infection. There is no evidence of lymphedema or lymphatic streaking. The patient has supple compartments of the left arm, forearm and hand.  Surgical incision is healing well.  She has mild tenderness palpation over the zone of dissection and firmness of the scar but overall doing well.  She demonstrates good range of motion through flexion extension pronosupination of the wrist.     Imaging/Test Results:  None today.     Assessment:  Left carpal tunnel release, 2 months postop with resolution of numbness and tingling with mild lingering scar pain.     Plan:  Recommendations are made continued weightbearing activities to tolerance.  We  discussed that this likely continue to remodel and improve over time.  Continue with home exercise program for massage.  Follow-up with our office in as-needed basis if symptoms dictate.  All questions answered at today's visit.    Jeanie Kirk PA-C

## 2025-06-17 ENCOUNTER — OFFICE VISIT (OUTPATIENT)
Dept: ORTHOPEDIC SURGERY | Facility: CLINIC | Age: 55
End: 2025-06-17
Payer: COMMERCIAL

## 2025-06-17 DIAGNOSIS — G56.01 CARPAL TUNNEL SYNDROME OF RIGHT WRIST: Primary | ICD-10-CM

## 2025-06-17 PROCEDURE — 99214 OFFICE O/P EST MOD 30 MIN: CPT | Performed by: ORTHOPAEDIC SURGERY

## 2025-06-17 PROCEDURE — 99212 OFFICE O/P EST SF 10 MIN: CPT | Performed by: ORTHOPAEDIC SURGERY

## 2025-06-17 PROCEDURE — 1036F TOBACCO NON-USER: CPT | Performed by: ORTHOPAEDIC SURGERY

## 2025-06-17 NOTE — PROGRESS NOTES
6/17/2025    Chief Complaint   Patient presents with    Right Wrist - Carpal Tunnel     Discuss sx       History of Present Illness:  Patient Anu Arcos , 54 y.o. female, presents today, 6/17/2025, for evaluation of right hand and wrist pain, numbness, and weakness.  Patient is status post left carpal tunnel release in November 2024.  She states that things on that side are doing great.  She feels that numbness and tingling in median nerve distribution on the right however is increasing.  She is at increased numbness tingling and pain associated with this.  Is starting to wake her from sleep at nighttime.  She is here today to discuss surgical options as nighttime bracing regiment is no longer offering her as much relief as she requires.       Review of Systems:   GENERAL: Negative  GI: Negative  MUSCULOSKELETAL: See HPI  SKIN: Negative  NEURO:  Negative     Physical Exam:  GENERAL:  Alert and oriented to person, place, and time.  No acute distress and breathing comfortably; pleasant and cooperative with the examination.  HEENT:  Head is normocephalic and atraumatic.  NECK:  Supple, no visible swelling.  CARDIOVASCULAR:  No palpable tachycardia.  LUNGS:  No audible wheezing or labored breathing.  ABDOMEN:  Nondistended.  Extremities: Evaluation of the right upper extremity finds the patient to have a palpable radial artery at the wrist with brisk capillary refill to all digits. The patient has intact sensorium to axillary, radial, median and ulnar nerves. There are no open wounds. There are no signs of infection. There is no evidence of lymphedema or lymphatic streaking. The patient has supple compartments of the right arm, forearm and hand.  Positive Tinel's over the median nerve the right wrist.     Imaging/Test Results:  None today.     Assessment:  Right wrist carpal tunnel syndrome, recalcitrant to nonoperative treatment strategies     Plan:  Treatment options were discussed including both operative and  non-operative treatment strategies.  Patient elects to proceed forth with right carpal tunnel release under wide-awake approach anesthesia.  Risks, benefits, and alternatives to surgery were discussed including, but not limited to infection risk, persistent or incomplete relief of pain, swelling, or stiffness, and post-operative pain and discomfort.  The patient verbalized agreement and understanding of the plan for care.  All questions answered at today's visit.  Plan for follow-up 10-14 days post-op.    In a face to face encounter, I performed a history and physical examination, discussed pertinent diagnostic studies if indicated, and discussed diagnosis and management strategies with both the patient and the mid-level provider. I reviewed the mid-level's note and agree with the documented findings and plan of care.  Patient presents today for evaluation of highly symptomatic right carpal tunnel syndrome.  Previous nonoperative management by way of bracing but now symptoms are worsening and brace is no longer effective.  Positive Tinel's over course of median nerve to right wrist.  Status post left carpal tunnel release which did great.  We talked about operative and nonoperative strategies.  Patient elects proceed forth with right carpal tunnel release.  Plan for wide-awake approach to anesthesia and surgery at some point in the near future.

## 2025-06-19 DIAGNOSIS — G89.18 POST-OP PAIN: Primary | ICD-10-CM

## 2025-06-19 RX ORDER — HYDROCODONE BITARTRATE AND ACETAMINOPHEN 5; 325 MG/1; MG/1
1 TABLET ORAL EVERY 8 HOURS PRN
Qty: 6 TABLET | Refills: 0 | Status: SHIPPED | OUTPATIENT
Start: 2025-06-19 | End: 2025-06-21

## 2025-06-20 PROCEDURE — 64721 CARPAL TUNNEL SURGERY: CPT | Performed by: ORTHOPAEDIC SURGERY

## 2025-07-03 ENCOUNTER — OFFICE VISIT (OUTPATIENT)
Dept: ORTHOPEDIC SURGERY | Facility: CLINIC | Age: 55
End: 2025-07-03
Payer: COMMERCIAL

## 2025-07-03 DIAGNOSIS — G56.01 CARPAL TUNNEL SYNDROME OF RIGHT WRIST: Primary | ICD-10-CM

## 2025-07-03 PROCEDURE — 99212 OFFICE O/P EST SF 10 MIN: CPT | Performed by: ORTHOPAEDIC SURGERY

## 2025-07-03 NOTE — PROGRESS NOTES
Patient presents today status post right carpal tunnel release.  Numbness and tingling is gone.  Wound looks great.  She was instructed on home exercise program for palmar massage scar desensitization and range of motion exercises to wrist and digits.  Follow-up with me on a as needed basis.  Patient is agreeable with this strategy.  She understands that it will take some time for the postoperative pain to fully relent.

## 2025-10-29 ENCOUNTER — APPOINTMENT (OUTPATIENT)
Dept: PRIMARY CARE | Facility: CLINIC | Age: 55
End: 2025-10-29
Payer: COMMERCIAL